# Patient Record
Sex: FEMALE | Race: WHITE
[De-identification: names, ages, dates, MRNs, and addresses within clinical notes are randomized per-mention and may not be internally consistent; named-entity substitution may affect disease eponyms.]

---

## 2018-07-21 ENCOUNTER — HOSPITAL ENCOUNTER (EMERGENCY)
Dept: HOSPITAL 46 - ED | Age: 48
Discharge: HOME | End: 2018-07-21
Payer: COMMERCIAL

## 2018-07-21 VITALS — WEIGHT: 150 LBS | HEIGHT: 68 IN | BODY MASS INDEX: 22.73 KG/M2

## 2018-07-21 DIAGNOSIS — R11.0: ICD-10-CM

## 2018-07-21 DIAGNOSIS — R51: Primary | ICD-10-CM

## 2025-03-25 ENCOUNTER — HOSPITAL ENCOUNTER (OUTPATIENT)
Dept: HOSPITAL 46 - OPS | Age: 55
Discharge: HOME | End: 2025-03-25
Attending: SURGERY
Payer: COMMERCIAL

## 2025-03-25 VITALS — HEIGHT: 68 IN | WEIGHT: 154.32 LBS | BODY MASS INDEX: 23.39 KG/M2

## 2025-03-25 VITALS — SYSTOLIC BLOOD PRESSURE: 102 MMHG | DIASTOLIC BLOOD PRESSURE: 56 MMHG

## 2025-03-25 VITALS — SYSTOLIC BLOOD PRESSURE: 97 MMHG | DIASTOLIC BLOOD PRESSURE: 62 MMHG

## 2025-03-25 DIAGNOSIS — N39.3: ICD-10-CM

## 2025-03-25 DIAGNOSIS — K64.8: ICD-10-CM

## 2025-03-25 DIAGNOSIS — Z78.0: ICD-10-CM

## 2025-03-25 DIAGNOSIS — Z12.11: Primary | ICD-10-CM

## 2025-03-25 DIAGNOSIS — E03.9: ICD-10-CM

## 2025-03-25 PROCEDURE — G0500 MOD SEDAT ENDO SERVICE >5YRS: HCPCS

## 2025-03-25 PROCEDURE — 0DJD8ZZ INSPECTION OF LOWER INTESTINAL TRACT, VIA NATURAL OR ARTIFICIAL OPENING ENDOSCOPIC: ICD-10-PCS | Performed by: SURGERY

## 2025-03-25 NOTE — NUR
03/25/25 0858 Camila Rodgers
0839- PT ARRIVES TO PACU, LEFT LATERAL POSITION, REACTIVE TO STIMULUS.
O2 AT 3L PER NC, BREATHING EVEN ADN NON LABORED. LR INFUSING TO RW IV.
ABD SOFT, NON DISTENDED. ALL MONITORS IN PLACE.
 
0847- PT MOVED TO ROOM AIR, WAKES ON AND OFF AND ASKS QUESTIONS. PT
FALLS BACK TO SLEEP. NO SIGNS OF DISTRESS. ENCOURAGED TO PASS GAS, PT
HAS PASSED SOME. TEARFUL INTERMITTENTLY.

## 2025-03-25 NOTE — OR
[facility]
                                    [address], [place]  [address]
_________________________________________________________________________________________
                                                                 Signed   
 
 
DATE OF OPERATION:
03/25/2025
 
SURGEON:
Asya Ruggiero MD
 
PREOPERATIVE DIAGNOSIS:
Screening.
 
POSTOPERATIVE DIAGNOSIS:
Unremarkable colonoscopy.
 
PROCEDURE:
Colonoscopy without biopsy.
 
ESTIMATED BLOOD LOSS:
None.
 
INDICATIONS:
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
emergency room.  Therefore, she is very knowledgeable with the medical field.  We
actually met in the office back in 2020 at the age of 50.  Unfortunately COVID kept her
from having her initial screening colonoscopy.  Her primary care provider had retired.
Her gynecologist reminded her to come see me once again.  Her Cologuard test was
negative.  She has no lower GI complaints.  There is no family history of colon cancer
or polyps.  In the office I had given her a pamphlet on colonoscopy.  We had reviewed
the nature of the test.  There is risk including, but not limited to gas bloating,
crampy abdominal pain, bleeding, perforation requiring surgery, and missed diagnosis.
We also reviewed the written instructions for a bowel prep line by line.  She also
understands the need for IV conscious sedation.  It looks like her daughter is going to
be taking her home afterwards.  She had expressed understanding and wished to proceed. 
 
DESCRIPTION OF PROCEDURE:
Edu was taken into our endoscopy suite and placed in the left lateral decubitus
position.  She was given IV sedation with 5 mg of Versed, 125 mcg of fentanyl.  A
digital rectal exam was performed.  This was unremarkable.  She has no external
hemorrhoids.  Good sphincter tone.  There were no masses.  The adult colonoscope was
introduced and advanced all the way around into the cecum under direct visualization of
the camera.  It took just a little extra sedation and some mild abdominal compression to
get the scope into the cecum itself.  Her prep was quite good.  We could easily see the
appendiceal orifice and ileocecal valve.  The scope was slowly withdrawn.  We took
several pictures throughout for photodocumentation.  She had no pathology throughout the
 
    Electronically Signed By: ASYA RUGGIERO MD  03/25/25 0940
_________________________________________________________________________________________
PATIENT NAME:     EDU ERWIN                    
MEDICAL RECORD #: [id number]            OPERATIVE REPORT              
          ACCT #: [id number]  
DATE OF BIRTH:   [birth year]            REPORT #: [id number]      
PHYSICIAN:        ASYA RUGGIERO MD             
PCP:              [facility] OATES  [facility]      
REPORT IS CONFIDENTIAL AND NOT TO BE RELEASED WITHOUT AUTHORIZATION
 
 
                                  [facility]
                                    [address], [place]  [address]
_________________________________________________________________________________________
                                                                 Signed   
 
 
entire colon or rectum.  Upon retroflexion of scope she has minimal if any internal
hemorrhoid tissue.  After this, the gas was suctioned out, colonoscope removed.  Edu
tolerated the procedure quite well. 
 
RECOMMENDATIONS:
Edu is welcome to return in 10 years for repeat screening colonoscopy.
 
 
 
            ________________________________________
            MD ELLIE Payne/SERGL
Job #:  [id number]
DD:  03/25/2025 08:43:31
DT:  03/25/2025 09:08:34
 
cc:            MD Pham Payne
 
 
Copies:  ASYA RUGGIERO MD
~
 
 
 
 
 
 
 
 
 
 
 
 
 
 
 
 
 
 
    Electronically Signed By: ASYA RUGGIERO MD  03/25/25 0940
_________________________________________________________________________________________
PATIENT NAME:     EDU ERWIN                    
MEDICAL RECORD #: [id number]            OPERATIVE REPORT              
          ACCT #: [id number]  
DATE OF BIRTH:   [birth year]            REPORT #: [id number]      
PHYSICIAN:        ASYA RUGGIERO MD             
PCP:              [facility] O[facility]  [facility]      
REPORT IS CONFIDENTIAL AND NOT TO BE RELEASED WITHOUT AUTHORIZATION